# Patient Record
Sex: MALE | Race: WHITE | NOT HISPANIC OR LATINO
[De-identification: names, ages, dates, MRNs, and addresses within clinical notes are randomized per-mention and may not be internally consistent; named-entity substitution may affect disease eponyms.]

---

## 2018-05-11 ENCOUNTER — APPOINTMENT (OUTPATIENT)
Dept: UROLOGY | Facility: CLINIC | Age: 43
End: 2018-05-11

## 2018-05-25 ENCOUNTER — APPOINTMENT (OUTPATIENT)
Dept: UROLOGY | Facility: CLINIC | Age: 43
End: 2018-05-25

## 2018-05-25 ENCOUNTER — OUTPATIENT (OUTPATIENT)
Dept: OUTPATIENT SERVICES | Facility: HOSPITAL | Age: 43
LOS: 1 days | End: 2018-05-25
Payer: COMMERCIAL

## 2018-05-25 DIAGNOSIS — R35.0 FREQUENCY OF MICTURITION: ICD-10-CM

## 2018-05-25 DIAGNOSIS — N39.43 POST-VOID DRIBBLING: ICD-10-CM

## 2018-05-25 DIAGNOSIS — R39.15 URGENCY OF URINATION: ICD-10-CM

## 2018-05-25 PROCEDURE — G0463: CPT | Mod: 25

## 2018-05-25 PROCEDURE — 51798 US URINE CAPACITY MEASURE: CPT

## 2018-05-27 LAB
APPEARANCE: ABNORMAL
BACTERIA: ABNORMAL
BILIRUBIN URINE: NEGATIVE
BLOOD URINE: NEGATIVE
CALCIUM OXALATE CRYSTALS: NEGATIVE
COLOR: YELLOW
GLUCOSE QUALITATIVE U: NEGATIVE MG/DL
GRANULAR CASTS: 0 /LPF
HYALINE CASTS: 0 /LPF
KETONES URINE: NEGATIVE
LEUKOCYTE ESTERASE URINE: NEGATIVE
MICROSCOPIC-UA: NORMAL
NITRITE URINE: NEGATIVE
PH URINE: 8
PROTEIN URINE: NEGATIVE MG/DL
RED BLOOD CELLS URINE: 0 /HPF
SPECIFIC GRAVITY URINE: 1.02
SQUAMOUS EPITHELIAL CELLS: 1 /HPF
TRIPLE PHOSPHATE CRYSTALS: NEGATIVE
URIC ACID CRYSTALS: NEGATIVE
UROBILINOGEN URINE: NEGATIVE MG/DL
WHITE BLOOD CELLS URINE: 4 /HPF

## 2018-05-29 DIAGNOSIS — R39.15 URGENCY OF URINATION: ICD-10-CM

## 2018-05-29 DIAGNOSIS — N39.43 POST-VOID DRIBBLING: ICD-10-CM

## 2018-06-29 ENCOUNTER — APPOINTMENT (OUTPATIENT)
Dept: UROLOGY | Facility: CLINIC | Age: 43
End: 2018-06-29

## 2018-12-07 LAB — BACTERIA UR CULT: ABNORMAL

## 2024-08-07 ENCOUNTER — APPOINTMENT (OUTPATIENT)
Dept: NEUROLOGY | Facility: CLINIC | Age: 49
End: 2024-08-07

## 2024-08-07 PROCEDURE — 99205 OFFICE O/P NEW HI 60 MIN: CPT

## 2024-08-14 NOTE — DISCUSSION/SUMMARY
[FreeTextEntry1] :  50 yo male, h/o neurocysticercosis,  shunt for hydrocephalus, peripheral neuropathy, depression, chronic hyponatremia (asymptomatic), frequent falls, here at neurology clinic for second opinion for shaking episodes.  While patient at neurology clinic today, he was noted to have side to side and up and down head bobbing. Patient talking through the episode, and responding to examiner. Per patient and staff accompanying him (who has been working with him at his facility for 5 years now), all of the patient's events are similar to this. Multiple EEG done in past have been negative for epileptiform activity. Patient is currently on dilantin and VPA, prescribed by a doctor at his facility.   Impression: Psychogenic Nonepileptic Events  Plan: [] Counselled patient that his events are NOT epileptic [] Patient should continue his therapy sessions with psych [] Would have patient return to his regular neurologist and slowly wean off ASMs   Note written with assistance of DANIELLE Rodriguez. Case and plan discussed with Dr. Christian, epilepsy attending.

## 2024-08-14 NOTE — DISCUSSION/SUMMARY
[FreeTextEntry1] :  48 yo male, h/o neurocysticercosis,  shunt for hydrocephalus, peripheral neuropathy, depression, chronic hyponatremia (asymptomatic), frequent falls, here at neurology clinic for second opinion for shaking episodes.  While patient at neurology clinic today, he was noted to have side to side and up and down head bobbing. Patient talking through the episode, and responding to examiner. Per patient and staff accompanying him (who has been working with him at his facility for 5 years now), all of the patient's events are similar to this. Multiple EEG done in past have been negative for epileptiform activity. Patient is currently on dilantin and VPA, prescribed by a doctor at his facility.   Impression: Psychogenic Nonepileptic Events  Plan: [] Counselled patient that his events are NOT epileptic [] Patient should continue his therapy sessions with psych [] Would have patient return to his regular neurologist and slowly wean off ASMs   Note written with assistance of DANIELLE Rodriguez. Case and plan discussed with Dr. Christian, epilepsy attending.

## 2024-08-14 NOTE — HISTORY OF PRESENT ILLNESS
[FreeTextEntry1] : Resident of the long term facility [facility]   Meds: Dilantin 200 mg bid Depakote 500 mg bid Neurontin   Tylenol Ca +Vit D Cymbalta  ASA 81 mg Vitamin D Flomax Lisinopril Lasix eye drops lubricate Marck Vo, 48 yo male, h/o HLD, renal calculus, OA, neurocysticercosis,  shunt for hydrocephalus, peripheral neuropathy, depression, chronic hyponatremia (asymptomatic), frequent falls, here at neurology clinic for second opinion for shaking episodes. Patient reports he never had these shaking events as a child, but began having these events after his  shunt surgery years ago. He has been at his extended care facility for many years now and patient reports episodes of shaking of the head and right leg extension. multiple times per day for many years. lasting for seconds to minutes at a time, no provoking factors or triggers, no preceding aura, no post-ictal confusion. While patient at neurology clinic today, he was noted to have side to side and up and down head bobbing. Patient talking through the episode, and responding to examiner. Per patient and staff accompanying him (who has been working with him at his facility for 5 years now), all of the patient's events are similar to this.  Patient recently presented to [facility] for leg swelling, was noted to have these head shaking episodes. Multiple EEG done in past have been negative for epileptiform activity. Patient is currently on dilantin and VPA, prescribed by a doctor at his facility. Patient complaining of drowsiness from these medications.  He is under the care of psychiatrist and undergoes psychotherapy multiple times a week. Patient reports his mother had seizures when he was a child.

## 2024-08-14 NOTE — ASSESSMENT
[FreeTextEntry1] : 50 yo male, h/o neurocysticercosis,  shunt for hydrocephalus, peripheral neuropathy, depression, chronic hyponatremia (asymptomatic), frequent falls, here at neurology clinic for second opinion for shaking episodes.  While patient at neurology clinic today, he was noted to have side to side and up and down head bobbing. Patient talking through the episode, and responding to examiner. Per patient and staff accompanying him (who has been working with him at his facility for 5 years now), all of the patient's events are similar to this. Multiple EEG done in past have been negative for epileptiform activity. Patient is currently on dilantin and VPA, prescribed by a doctor at his facility.   Impression: Psychogenic Nonepileptic Events  Plan: [] Counselled patient that his events are NOT epileptic [] Patient should continue his therapy sessions with psych [] Would have patient return to his regular neurologist and slowly wean off ASMs

## 2024-08-14 NOTE — PHYSICAL EXAM
[FreeTextEntry1] : Male patient, in wheelchair, wearing a helmet CHEST: No signs of  distress, on room air ABD: Soft, NEURO:    MENTAL STATUS: AAOx3   LANG/SPEECH: Fluent, intact naming, repetition & comprehension   CRANIAL NERVES:   II: VFF intact to FC   III, IV, VI: EOM intact, no gaze preference or deviation   V: normal   VII: no overt facial asymmetry   VIII: normal hearing to speech   MOTOR: 5/5 in both upper extremities. 5/5 strength in RLE, 4+/5 LLE (effort-limited)   REFLEXES: 2/4 throughout upper extremities, 0 b/l patellar   SENSORY: Normal to touch  in all extremiteis   COORD: Normal finger to nose

## 2024-08-14 NOTE — ASSESSMENT
[FreeTextEntry1] : 48 yo male, h/o neurocysticercosis,  shunt for hydrocephalus, peripheral neuropathy, depression, chronic hyponatremia (asymptomatic), frequent falls, here at neurology clinic for second opinion for shaking episodes.  While patient at neurology clinic today, he was noted to have side to side and up and down head bobbing. Patient talking through the episode, and responding to examiner. Per patient and staff accompanying him (who has been working with him at his facility for 5 years now), all of the patient's events are similar to this. Multiple EEG done in past have been negative for epileptiform activity. Patient is currently on dilantin and VPA, prescribed by a doctor at his facility.   Impression: Psychogenic Nonepileptic Events  Plan: [] Counselled patient that his events are NOT epileptic [] Patient should continue his therapy sessions with psych [] Would have patient return to his regular neurologist and slowly wean off ASMs